# Patient Record
Sex: FEMALE | Race: BLACK OR AFRICAN AMERICAN | NOT HISPANIC OR LATINO | URBAN - METROPOLITAN AREA
[De-identification: names, ages, dates, MRNs, and addresses within clinical notes are randomized per-mention and may not be internally consistent; named-entity substitution may affect disease eponyms.]

---

## 2017-02-20 ENCOUNTER — FOLLOW UP ESTABLISHED (OUTPATIENT)
Dept: URBAN - METROPOLITAN AREA CLINIC 51 | Facility: CLINIC | Age: 57
End: 2017-02-20
Payer: COMMERCIAL

## 2017-02-20 DIAGNOSIS — Z79.899 OTHER LONG TERM (CURRENT) DRUG THERAPY: ICD-10-CM

## 2017-02-20 PROCEDURE — 92134 CPTRZ OPH DX IMG PST SGM RTA: CPT | Performed by: OPTOMETRIST

## 2017-02-20 PROCEDURE — 92012 INTRM OPH EXAM EST PATIENT: CPT | Performed by: OPTOMETRIST

## 2017-02-20 PROCEDURE — 92083 EXTENDED VISUAL FIELD XM: CPT | Performed by: OPTOMETRIST

## 2017-02-20 RX ORDER — OLOPATADINE HYDROCHLORIDE 7 MG/ML
0.7 % SOLUTION OPHTHALMIC
Qty: 1 | Refills: 3 | Status: INACTIVE
Start: 2017-02-20 | End: 2017-02-21

## 2017-02-20 ASSESSMENT — INTRAOCULAR PRESSURE
OD: 21
OS: 18

## 2017-02-21 RX ORDER — OLOPATADINE HYDROCHLORIDE 2 MG/ML
0.2 % SOLUTION/ DROPS OPHTHALMIC
Qty: 3 | Refills: 1 | Status: INACTIVE
Start: 2017-02-21 | End: 2019-01-02

## 2017-09-14 ENCOUNTER — FOLLOW UP ESTABLISHED (OUTPATIENT)
Dept: URBAN - METROPOLITAN AREA CLINIC 51 | Facility: CLINIC | Age: 57
End: 2017-09-14
Payer: COMMERCIAL

## 2017-09-14 DIAGNOSIS — H10.45 OTHER CHRONIC ALLERGIC CONJUNCTIVITIS: Primary | ICD-10-CM

## 2017-09-14 PROCEDURE — 92083 EXTENDED VISUAL FIELD XM: CPT | Performed by: OPTOMETRIST

## 2017-09-14 PROCEDURE — 92014 COMPRE OPH EXAM EST PT 1/>: CPT | Performed by: OPTOMETRIST

## 2017-09-14 PROCEDURE — 92134 CPTRZ OPH DX IMG PST SGM RTA: CPT | Performed by: OPTOMETRIST

## 2017-09-14 RX ORDER — FLUOROMETHOLONE 1 MG/ML
0.1 % SOLUTION/ DROPS OPHTHALMIC
Qty: 1 | Refills: 2 | Status: INACTIVE
Start: 2017-09-14 | End: 2019-01-02

## 2017-09-14 ASSESSMENT — INTRAOCULAR PRESSURE
OD: 17
OS: 17

## 2017-09-14 ASSESSMENT — KERATOMETRY
OS: 43.00
OD: 43.59

## 2017-09-14 ASSESSMENT — VISUAL ACUITY
OS: 20/20
OD: 20/20

## 2018-05-22 ENCOUNTER — FOLLOW UP ESTABLISHED (OUTPATIENT)
Dept: URBAN - METROPOLITAN AREA CLINIC 51 | Facility: CLINIC | Age: 58
End: 2018-05-22
Payer: COMMERCIAL

## 2018-05-22 PROCEDURE — 92083 EXTENDED VISUAL FIELD XM: CPT | Performed by: OPTOMETRIST

## 2018-05-22 PROCEDURE — 92012 INTRM OPH EXAM EST PATIENT: CPT | Performed by: OPTOMETRIST

## 2018-05-22 ASSESSMENT — INTRAOCULAR PRESSURE
OS: 20
OD: 21

## 2019-01-02 ENCOUNTER — FOLLOW UP ESTABLISHED (OUTPATIENT)
Dept: URBAN - METROPOLITAN AREA CLINIC 51 | Facility: CLINIC | Age: 59
End: 2019-01-02
Payer: COMMERCIAL

## 2019-01-02 DIAGNOSIS — M06.9 RHEUMATOID ARTHRITIS, UNSPECIFIED: ICD-10-CM

## 2019-01-02 DIAGNOSIS — H04.123 TEAR FILM INSUFFICIENCY OF BILATERAL LACRIMAL GLANDS: ICD-10-CM

## 2019-01-02 PROCEDURE — 92014 COMPRE OPH EXAM EST PT 1/>: CPT | Performed by: OPTOMETRIST

## 2019-01-02 PROCEDURE — 92134 CPTRZ OPH DX IMG PST SGM RTA: CPT | Performed by: OPTOMETRIST

## 2019-01-02 PROCEDURE — 92083 EXTENDED VISUAL FIELD XM: CPT | Performed by: OPTOMETRIST

## 2019-01-02 ASSESSMENT — INTRAOCULAR PRESSURE
OS: 18
OD: 22

## 2019-01-02 ASSESSMENT — KERATOMETRY
OS: 42.73
OD: 43.47

## 2019-01-02 ASSESSMENT — VISUAL ACUITY
OS: 20/20
OD: 20/20

## 2019-06-24 ENCOUNTER — FOLLOW UP ESTABLISHED (OUTPATIENT)
Dept: URBAN - METROPOLITAN AREA CLINIC 51 | Facility: CLINIC | Age: 59
End: 2019-06-24
Payer: COMMERCIAL

## 2019-06-24 PROCEDURE — 92012 INTRM OPH EXAM EST PATIENT: CPT | Performed by: OPTOMETRIST

## 2019-06-24 PROCEDURE — 92250 FUNDUS PHOTOGRAPHY W/I&R: CPT | Performed by: OPTOMETRIST

## 2019-06-24 ASSESSMENT — INTRAOCULAR PRESSURE
OS: 20
OD: 22

## 2019-12-23 ENCOUNTER — FOLLOW UP ESTABLISHED (OUTPATIENT)
Dept: URBAN - METROPOLITAN AREA CLINIC 51 | Facility: CLINIC | Age: 59
End: 2019-12-23
Payer: COMMERCIAL

## 2019-12-23 PROCEDURE — 92083 EXTENDED VISUAL FIELD XM: CPT | Performed by: OPTOMETRIST

## 2019-12-23 PROCEDURE — 92014 COMPRE OPH EXAM EST PT 1/>: CPT | Performed by: OPTOMETRIST

## 2019-12-23 PROCEDURE — 92134 CPTRZ OPH DX IMG PST SGM RTA: CPT | Performed by: OPTOMETRIST

## 2019-12-23 ASSESSMENT — INTRAOCULAR PRESSURE
OD: 21
OS: 20

## 2019-12-23 ASSESSMENT — KERATOMETRY
OD: 43.55
OS: 43.02

## 2019-12-23 ASSESSMENT — VISUAL ACUITY
OS: 20/20
OD: 20/20

## 2021-02-04 ENCOUNTER — FOLLOW UP ESTABLISHED (OUTPATIENT)
Dept: URBAN - METROPOLITAN AREA CLINIC 51 | Facility: CLINIC | Age: 61
End: 2021-02-04
Payer: COMMERCIAL

## 2021-02-04 DIAGNOSIS — E11.9 TYPE 2 DIABETES MELLITUS WITHOUT COMPLICATIONS: ICD-10-CM

## 2021-02-04 DIAGNOSIS — H25.13 AGE-RELATED NUCLEAR CATARACT, BILATERAL: ICD-10-CM

## 2021-02-04 DIAGNOSIS — Z79.84 LONG TERM (CURRENT) USE OF ORAL ANTIDIABETIC DRUGS: ICD-10-CM

## 2021-02-04 DIAGNOSIS — H40.053 OCULAR HYPERTENSION, BILATERAL: Primary | ICD-10-CM

## 2021-02-04 PROCEDURE — 92014 COMPRE OPH EXAM EST PT 1/>: CPT | Performed by: OPTOMETRIST

## 2021-02-04 PROCEDURE — 92133 CPTRZD OPH DX IMG PST SGM ON: CPT | Performed by: OPTOMETRIST

## 2021-02-04 PROCEDURE — 92134 CPTRZ OPH DX IMG PST SGM RTA: CPT | Performed by: OPTOMETRIST

## 2021-02-04 PROCEDURE — 92083 EXTENDED VISUAL FIELD XM: CPT | Performed by: OPTOMETRIST

## 2021-02-04 ASSESSMENT — KERATOMETRY
OD: 43.33
OS: 43.03

## 2021-02-04 ASSESSMENT — VISUAL ACUITY
OS: 20/25
OD: 20/20

## 2021-02-04 ASSESSMENT — INTRAOCULAR PRESSURE
OD: 20
OS: 20

## 2021-05-03 ENCOUNTER — OFFICE VISIT (OUTPATIENT)
Dept: URBAN - METROPOLITAN AREA CLINIC 43 | Facility: CLINIC | Age: 61
End: 2021-05-03
Payer: COMMERCIAL

## 2021-05-03 PROCEDURE — 92014 COMPRE OPH EXAM EST PT 1/>: CPT | Performed by: OPTOMETRIST

## 2021-05-03 ASSESSMENT — INTRAOCULAR PRESSURE
OS: 20
OD: 24
OD: 23
OS: 19

## 2021-05-03 NOTE — IMPRESSION/PLAN
Impression: Optic neuritis: H46.9. OD Plan: Potentially optic neuritis, severely reduced VA,  pain on eye movement, +  desaturation OD. No sign of disc edema but likely retro-bulbar optic neuritis. + Lupus. Refer immediately to PCP, has been longer than 72 hours since onset of symptoms. Recommend IV steroids and MRI of brain and orbits.   return 1 month for HVF30-2 and f/u

## 2021-06-01 ENCOUNTER — OFFICE VISIT (OUTPATIENT)
Dept: URBAN - METROPOLITAN AREA CLINIC 43 | Facility: CLINIC | Age: 61
End: 2021-06-01
Payer: COMMERCIAL

## 2021-06-01 PROCEDURE — 92133 CPTRZD OPH DX IMG PST SGM ON: CPT | Performed by: OPTOMETRIST

## 2021-06-01 PROCEDURE — 92083 EXTENDED VISUAL FIELD XM: CPT | Performed by: OPTOMETRIST

## 2021-06-01 PROCEDURE — 92012 INTRM OPH EXAM EST PATIENT: CPT | Performed by: OPTOMETRIST

## 2021-06-01 ASSESSMENT — INTRAOCULAR PRESSURE
OS: 18
OD: 22

## 2021-06-01 NOTE — IMPRESSION/PLAN
Impression: Unspecified optic neuritis: H46.9. Plan: pt went to ER and had MRI, pt told optic neuritis likely 2/2 h/o LUpus, pt placed on IV steroids, vision has improved, no APD HVF 30-2 today: somewhat reliable, OD: dense constriction greatest superiorly, inf arcuate (new since 02/2021), OS: sup and inf arcuate, (stable OS) OCT RNFL today: OD: bdl thinning sup and inf, OS: bdl sup thin, + inf rim thinning Could be glaucomatous changes OS, cont.  care with rheumatology
return 3 months for IOP/HVF24-2/OCT RNFL

## 2021-09-03 ENCOUNTER — OFFICE VISIT (OUTPATIENT)
Dept: URBAN - METROPOLITAN AREA CLINIC 43 | Facility: CLINIC | Age: 61
End: 2021-09-03
Payer: COMMERCIAL

## 2021-09-03 PROCEDURE — 92133 CPTRZD OPH DX IMG PST SGM ON: CPT | Performed by: OPTOMETRIST

## 2021-09-03 PROCEDURE — 92012 INTRM OPH EXAM EST PATIENT: CPT | Performed by: OPTOMETRIST

## 2021-09-03 PROCEDURE — 92083 EXTENDED VISUAL FIELD XM: CPT | Performed by: OPTOMETRIST

## 2021-09-03 ASSESSMENT — INTRAOCULAR PRESSURE
OS: 17
OD: 19

## 2021-09-03 NOTE — IMPRESSION/PLAN
Impression: Optic neuritis: H46.9. Plan: pt went to ER and had MRI, pt told optic neuritis likely 2/2 h/o LUpus, pt placed on IV steroids, vision has improved, no APD
pallor to Sarabj[facility] Mcintosh 76 noted today HVF 30-2 today:  reliable OU, OD: dense superior altitudinal (mostly stable from 05/2021 field), OS: Superior acuate (stable from 05/2021 field) OCT RNFL today: OD: Severe thinning 360, thinning inf, OS: overall somewhat thin RNFL, but no focal thinning
pt confirms that optic neuritis 2/2 Lupus, also seeing neurology for pituitary adenoma,  Ideally, pt to see neuro-ophthalmology but limited access
schedule consult with retina for any further suggestions of ophthalmic/neuro care

## 2021-09-03 NOTE — IMPRESSION/PLAN
Impression: Ocular hypertension, bilateral: H40.053.  Plan: previously noted, normal IOP today, ONH changes appear to be secondary to optic neuritis, not glc, monitor without tx

## 2021-09-13 ENCOUNTER — OFFICE VISIT (OUTPATIENT)
Dept: URBAN - METROPOLITAN AREA CLINIC 44 | Facility: CLINIC | Age: 61
End: 2021-09-13
Payer: COMMERCIAL

## 2021-09-13 DIAGNOSIS — H46.9 OPTIC NEURITIS: Primary | ICD-10-CM

## 2021-09-13 PROCEDURE — 92004 COMPRE OPH EXAM NEW PT 1/>: CPT | Performed by: OPHTHALMOLOGY

## 2021-09-13 ASSESSMENT — INTRAOCULAR PRESSURE
OS: 17
OD: 20

## 2021-09-13 NOTE — IMPRESSION/PLAN
Impression: Optic neuritis: H46.9. Plan: unclear history of onset, but clear evidence of optic atrophy OD. not likely to be ischemic ION due to no disc at risk. Cannot assess whether this is due to compression from pituitary adenoma (less likely given unilateral altitudinal defect) or lupus related optic neuropathy (already treated with IV steroids, currently inactive). cont care with neuro and endo, will recheck in 3months RTC 2-3 months 30-2 OU

## 2021-11-01 ENCOUNTER — OFFICE VISIT (OUTPATIENT)
Dept: URBAN - METROPOLITAN AREA CLINIC 44 | Facility: CLINIC | Age: 61
End: 2021-11-01
Payer: COMMERCIAL

## 2021-11-01 PROCEDURE — 99214 OFFICE O/P EST MOD 30 MIN: CPT | Performed by: OPHTHALMOLOGY

## 2021-11-01 PROCEDURE — 92083 EXTENDED VISUAL FIELD XM: CPT | Performed by: OPHTHALMOLOGY

## 2021-11-01 ASSESSMENT — INTRAOCULAR PRESSURE
OD: 19
OS: 17

## 2021-11-01 NOTE — IMPRESSION/PLAN
Impression: Optic neuritis: H46.9. Plan: unclear history of onset, but clear evidence of optic atrophy OD. not likely to be ischemic ION due to no disc at risk though cannot rule out CRAO. Cannot assess whether this is due to compression from pituitary adenoma (less likely given unilateral altitudinal defect) or lupus related optic neuropathy (already treated with IV steroids, currently inactive). cont care with neuro and rheum. will monitor eye to ensure this is not progressive.  

RTC 6 months 30-2 OU

## 2022-05-05 ENCOUNTER — OFFICE VISIT (OUTPATIENT)
Dept: URBAN - METROPOLITAN AREA CLINIC 44 | Facility: CLINIC | Age: 62
End: 2022-05-05
Payer: COMMERCIAL

## 2022-05-05 DIAGNOSIS — H40.053 OCULAR HYPERTENSION, BILATERAL: ICD-10-CM

## 2022-05-05 DIAGNOSIS — H46.9 OPTIC NEURITIS: Primary | ICD-10-CM

## 2022-05-05 PROCEDURE — 92083 EXTENDED VISUAL FIELD XM: CPT | Performed by: OPHTHALMOLOGY

## 2022-05-05 PROCEDURE — 92014 COMPRE OPH EXAM EST PT 1/>: CPT | Performed by: OPHTHALMOLOGY

## 2022-05-05 ASSESSMENT — INTRAOCULAR PRESSURE
OS: 17
OD: 19

## 2022-05-05 NOTE — IMPRESSION/PLAN
Impression: Optic neuritis: H46.9. Plan: stable from prior. mild inferior thinning, no edema. cont care with neuro and rheum. will monitor eye to ensure this is not progressive.  

RTC 6 months 30-2 OU

## 2022-11-07 ENCOUNTER — OFFICE VISIT (OUTPATIENT)
Dept: URBAN - METROPOLITAN AREA CLINIC 44 | Facility: CLINIC | Age: 62
End: 2022-11-07
Payer: COMMERCIAL

## 2022-11-07 DIAGNOSIS — H46.9 OPTIC NEURITIS: Primary | ICD-10-CM

## 2022-11-07 DIAGNOSIS — H40.053 OCULAR HYPERTENSION, BILATERAL: ICD-10-CM

## 2022-11-07 PROCEDURE — 92083 EXTENDED VISUAL FIELD XM: CPT | Performed by: OPHTHALMOLOGY

## 2022-11-07 PROCEDURE — 92014 COMPRE OPH EXAM EST PT 1/>: CPT | Performed by: OPHTHALMOLOGY

## 2022-11-07 PROCEDURE — 92134 CPTRZ OPH DX IMG PST SGM RTA: CPT | Performed by: OPHTHALMOLOGY

## 2022-11-07 ASSESSMENT — INTRAOCULAR PRESSURE
OD: 25
OS: 23

## 2022-11-07 NOTE — IMPRESSION/PLAN
Impression: Optic neuritis: H46.9. Plan: stable from prior. mild inferior thinning, no edema. cont care with neuro and rheum. will monitor eye to ensure this is not progressive.  

RTC 1 yr

## 2023-11-10 ENCOUNTER — OFFICE VISIT (OUTPATIENT)
Dept: URBAN - METROPOLITAN AREA CLINIC 44 | Facility: CLINIC | Age: 63
End: 2023-11-10
Payer: COMMERCIAL

## 2023-11-10 DIAGNOSIS — H46.9 OPTIC NEURITIS: Primary | ICD-10-CM

## 2023-11-10 DIAGNOSIS — H40.053 OCULAR HYPERTENSION, BILATERAL: ICD-10-CM

## 2023-11-10 PROCEDURE — 92014 COMPRE OPH EXAM EST PT 1/>: CPT | Performed by: OPHTHALMOLOGY

## 2023-11-10 ASSESSMENT — INTRAOCULAR PRESSURE
OS: 18
OD: 22

## 2024-01-08 ENCOUNTER — OFFICE VISIT (OUTPATIENT)
Dept: URBAN - METROPOLITAN AREA CLINIC 44 | Facility: CLINIC | Age: 64
End: 2024-01-08
Payer: COMMERCIAL

## 2024-01-08 DIAGNOSIS — H46.9 OPTIC NEURITIS: Primary | ICD-10-CM

## 2024-01-08 PROCEDURE — 92014 COMPRE OPH EXAM EST PT 1/>: CPT | Performed by: OPHTHALMOLOGY

## 2024-01-08 PROCEDURE — 92083 EXTENDED VISUAL FIELD XM: CPT | Performed by: OPHTHALMOLOGY

## 2024-01-08 ASSESSMENT — INTRAOCULAR PRESSURE
OD: 18
OS: 18

## 2024-05-07 ENCOUNTER — OFFICE VISIT (OUTPATIENT)
Dept: URBAN - METROPOLITAN AREA CLINIC 43 | Facility: CLINIC | Age: 64
End: 2024-05-07
Payer: COMMERCIAL

## 2024-05-07 DIAGNOSIS — Z79.84 LONG TERM (CURRENT) USE OF ORAL ANTIDIABETIC DRUGS: ICD-10-CM

## 2024-05-07 DIAGNOSIS — H25.813 COMBINED FORMS OF AGE-RELATED CATARACT, BILATERAL: ICD-10-CM

## 2024-05-07 DIAGNOSIS — E11.9 TYPE 2 DIABETES MELLITUS WITHOUT COMPLICATIONS: ICD-10-CM

## 2024-05-07 DIAGNOSIS — H46.9 OPTIC NEURITIS: Primary | ICD-10-CM

## 2024-05-07 DIAGNOSIS — Z79.899 OTHER LONG TERM (CURRENT) DRUG THERAPY: ICD-10-CM

## 2024-05-07 DIAGNOSIS — H40.053 OCULAR HYPERTENSION, BILATERAL: ICD-10-CM

## 2024-05-07 PROCEDURE — 92014 COMPRE OPH EXAM EST PT 1/>: CPT | Performed by: OPTOMETRIST

## 2024-05-07 PROCEDURE — 92134 CPTRZ OPH DX IMG PST SGM RTA: CPT | Performed by: OPTOMETRIST

## 2024-05-07 ASSESSMENT — INTRAOCULAR PRESSURE
OS: 21
OD: 22

## 2024-05-07 ASSESSMENT — VISUAL ACUITY
OS: HM
OD: 20/100

## 2024-05-07 ASSESSMENT — KERATOMETRY
OD: 42.50
OS: 43.00

## 2024-05-29 ENCOUNTER — OFFICE VISIT (OUTPATIENT)
Dept: URBAN - METROPOLITAN AREA CLINIC 43 | Facility: CLINIC | Age: 64
End: 2024-05-29
Payer: COMMERCIAL

## 2024-05-29 DIAGNOSIS — H46.9 OPTIC NEURITIS: ICD-10-CM

## 2024-05-29 DIAGNOSIS — H40.053 OCULAR HYPERTENSION, BILATERAL: Primary | ICD-10-CM

## 2024-05-29 PROCEDURE — 92014 COMPRE OPH EXAM EST PT 1/>: CPT | Performed by: OPTOMETRIST

## 2024-05-29 PROCEDURE — 92133 CPTRZD OPH DX IMG PST SGM ON: CPT | Performed by: OPTOMETRIST

## 2024-05-29 PROCEDURE — 92083 EXTENDED VISUAL FIELD XM: CPT | Performed by: OPTOMETRIST

## 2024-05-29 ASSESSMENT — INTRAOCULAR PRESSURE
OD: 19
OS: 18

## 2024-07-01 ENCOUNTER — OFFICE VISIT (OUTPATIENT)
Dept: URBAN - METROPOLITAN AREA CLINIC 44 | Facility: CLINIC | Age: 64
End: 2024-07-01
Payer: COMMERCIAL

## 2024-07-01 DIAGNOSIS — H46.9 OPTIC NEURITIS: Primary | ICD-10-CM

## 2024-07-01 DIAGNOSIS — H25.813 COMBINED FORMS OF AGE-RELATED CATARACT, BILATERAL: ICD-10-CM

## 2024-07-01 PROCEDURE — 92014 COMPRE OPH EXAM EST PT 1/>: CPT | Performed by: OPHTHALMOLOGY

## 2024-07-01 ASSESSMENT — INTRAOCULAR PRESSURE
OS: 17
OD: 17

## 2024-12-31 ENCOUNTER — OFFICE VISIT (OUTPATIENT)
Dept: URBAN - METROPOLITAN AREA CLINIC 43 | Facility: CLINIC | Age: 64
End: 2024-12-31
Payer: COMMERCIAL

## 2024-12-31 DIAGNOSIS — H46.9 OPTIC NEURITIS: Primary | ICD-10-CM

## 2024-12-31 PROCEDURE — 92083 EXTENDED VISUAL FIELD XM: CPT | Performed by: OPTOMETRIST

## 2024-12-31 PROCEDURE — 92133 CPTRZD OPH DX IMG PST SGM ON: CPT | Performed by: OPTOMETRIST

## 2024-12-31 PROCEDURE — 92014 COMPRE OPH EXAM EST PT 1/>: CPT | Performed by: OPTOMETRIST

## 2024-12-31 ASSESSMENT — INTRAOCULAR PRESSURE
OD: 15
OS: 16

## 2025-07-01 ENCOUNTER — OFFICE VISIT (OUTPATIENT)
Dept: URBAN - METROPOLITAN AREA CLINIC 43 | Facility: CLINIC | Age: 65
End: 2025-07-01
Payer: MEDICAID

## 2025-07-01 DIAGNOSIS — Z79.899 OTHER LONG TERM (CURRENT) DRUG THERAPY: ICD-10-CM

## 2025-07-01 DIAGNOSIS — E11.9 TYPE 2 DIABETES MELLITUS WITHOUT COMPLICATIONS: Primary | ICD-10-CM

## 2025-07-01 DIAGNOSIS — H25.813 COMBINED FORMS OF AGE-RELATED CATARACT, BILATERAL: ICD-10-CM

## 2025-07-01 DIAGNOSIS — H46.9 OPTIC NEURITIS: ICD-10-CM

## 2025-07-01 DIAGNOSIS — H43.393 OTHER VITREOUS OPACITIES, BILATERAL: ICD-10-CM

## 2025-07-01 DIAGNOSIS — H04.123 TEAR FILM INSUFFICIENCY OF BILATERAL LACRIMAL GLANDS: ICD-10-CM

## 2025-07-01 DIAGNOSIS — Z79.84 LONG TERM (CURRENT) USE OF ORAL ANTIDIABETIC DRUGS: ICD-10-CM

## 2025-07-01 PROCEDURE — 92014 COMPRE OPH EXAM EST PT 1/>: CPT | Performed by: OPTOMETRIST

## 2025-07-01 PROCEDURE — 92133 CPTRZD OPH DX IMG PST SGM ON: CPT | Performed by: OPTOMETRIST

## 2025-07-01 ASSESSMENT — INTRAOCULAR PRESSURE
OD: 17
OS: 15

## 2025-07-01 ASSESSMENT — VISUAL ACUITY
OD: 20/200
OS: HM

## 2025-07-01 ASSESSMENT — KERATOMETRY
OD: 43.00
OS: 42.88